# Patient Record
Sex: MALE | Race: BLACK OR AFRICAN AMERICAN | NOT HISPANIC OR LATINO | URBAN - METROPOLITAN AREA
[De-identification: names, ages, dates, MRNs, and addresses within clinical notes are randomized per-mention and may not be internally consistent; named-entity substitution may affect disease eponyms.]

---

## 2019-06-24 ENCOUNTER — OFFICE VISIT CONVERTED (OUTPATIENT)
Dept: PULMONOLOGY | Facility: CLINIC | Age: 59
End: 2019-06-24
Attending: INTERNAL MEDICINE

## 2021-05-28 VITALS
RESPIRATION RATE: 12 BRPM | BODY MASS INDEX: 43.05 KG/M2 | SYSTOLIC BLOOD PRESSURE: 147 MMHG | OXYGEN SATURATION: 98 % | HEART RATE: 68 BPM | HEIGHT: 71 IN | DIASTOLIC BLOOD PRESSURE: 93 MMHG | TEMPERATURE: 98.3 F | WEIGHT: 307.5 LBS

## 2021-05-28 NOTE — PROGRESS NOTES
Patient: MARY FAIRBANKS     Acct: [id number]     Report: #[id number]  UNIT #: [id number]     : 1960    Encounter Date:2019  PRIMARY CARE: PINEDA ECHEVERRIA  ***Signed***  --------------------------------------------------------------------------------------------------------------------  Chief Complaint      Encounter Date      2019            Primary Care Provider      PINEDA ECHEVERRIA            Referring Provider      PINEDA ECHEVERRIA            Patient Complaint      Patient is complaining of      New pt here for COPD            VITALS      Height 5 ft 11 in / 180.34 cm      Weight 307 lbs 8 oz / 139.109384 kg      BSA 2.53 m2      BMI 42.9 kg/m2      Temperature 98.3 F / 36.83 C - Oral      Pulse 68      Respirations 12      Blood Pressure 147/93 Sitting, Right Arm      Pulse Oximetry 98%, room air      Initial Exhaled Nitrous Oxide      Date:  2019      Exhaled Nitrous Oxide Results:  5            HPI      The patient is a 59 year old male who sees Dr. Skinner for rheumatology. He has    a diagnosis of rheumatoid arthritis and is on Enbrel.  He is referred to us for     CT scan abnormality.  He had CT scan of the chest done at [facility] back in 2018. At that time, he had CT scan of abdomen and pelvis     which showed lung nodule 5 mm size. Because of that he had repeat CT scan in     month at [facility] which showed a stable lung nodule. CT scan was    done on 2018 and showed 5 mm subpleural right middle lobe nodule. This     also showed pulmonary emphysema and single enlarged AP window lymph node     doubtful clinical significance.  He does smoke everyday.  He use to smoke one     pack per day for many years. He started smoking when he was 15 and is currently     down to around 15 cigarettes a day.  He has shortness of breath with activity     and is not able to move around a long distance.  He has a cough with clear     phlegm, has no chest pain  or chest tightness, no changes in weight or appetite.     He in fact has gained weight. He was [place], but moved to [place] 1.5 years     ago.  He currently takes Anoro daily, but does not have a rescue inhaler. He     does use a CPAP machine everyday. It is around two years old.  He receives his     supplies from [employer].            ROS      Constitutional:  Denies: Fatigue, Fever, Weight gain, Weight loss, Chills,     Insomnia, Other      Respiratory/Breathing:  Complains of: Shortness of air, Wheezing, Cough; Denies:    Hemoptysis, Pleuritic pain, Other      Endocrine:  Denies: Polydipsia, Polyuria, Heat/cold intolerance, Diabetes, Other      Eyes:  Denies: Blurred vision, Vision Changes, Other      Ears, nose, mouth, throat:  Denies: Congestion, Dysphagia, Hearing Changes, Nose    Bleeding, Nasal Discharge, Throat pain, Tinnitus, Other      Cardiovascular:  Denies: Chest Pain, Exertional dyspnea, Peripheral Edema,     Palpitations, Syncope, Wake up Gasping for air, Orthopnea, Tachycardia, Other      Gastrointestinal:  Denies: Abdominal pain/cramping, Bloody stools, Constipation,    Diarrhea, Melena, Nausea, Vomiting, Other      Genitourinary:  Denies: Dysuria, Urinary frequency, Incontinence, Hematuria,     Urgency, Other      Musculoskeletal:  Denies: Joint Pain, Joint Stiffness, Joint Swelling, Myalgias,    Other      Hematologic/lymphatic:  DENIES: Lymphadenopathy, Bruising, Bleeding tendencies,     Other      Neurologic:  Denies: Headache, Numbness, Weakness, Seizures, Other      Psychiatric:  Denies: Anxiety, Appropriate Effect, Depression, Other      Sleep:  No: Excessive daytime sleep, Morning Headache?, Snoring, Insomnia?, Stop    breathing at sleep?, Other      Integumentary:  Denies: Rash, Dry skin, Skin Warm to Touch, Other            FAMILY/SOCIAL/MEDICAL HX      Surgical History:  Yes: Abdominal Surgery (hernia repair x 2), CABG (stents x3,     ), Orthopedic Surgery (necrotic rt hip, carpal  tunnel right wrist), Vascular     Surgery (carotid atery left side)      Stroke - Family Hx:  Sister      Diabetes - Family Hx:  Father      Cancer/Type - Family Hx:  Sister (Lung cancer)      Is Father Still Living?:  No      Is Mother Still Living?:  No      Social History:  Tobacco Use; No Alcohol Use, No Recreational Drug use      Smoking status:  Current every day smoker (1/2 x 45 years)      Anticoagulation Therapy:  No      Antibiotic Prophylaxis:  No      Medical History:  Yes: Asthma, Chronic Bronchitis/COPD, High Blood Pressure,     High Cholesterol, Reflux Disease, Shortness Of Breath, Sinus Trouble      Psychiatric History      None            PREVENTION      Hx Influenza Vaccination:  No      Influenza Vaccine Declined:  Yes      2 or More Falls Past Year?:  No      Fall Past Year with Injury?:  No      Hx Pneumococcal Vaccination:  No      Encouraged to follow-up with:  PCP regarding preventative exams.      Chart initiated by      Gema Mijares MA            ALLERGIES/MEDICATIONS      Allergies:        Coded Allergies:             Crayfish (Verified  Allergy, Intermediate, 6/24/19)                  blisters           No Known Drug Allergies (Verified  Allergy, Intermediate, 6/24/19)      Medications    Last Reconciled on 6/24/19 13:44 by DYLAN DANIEL MD MDI-Albuterol (Ventolin HFA) 8 Gm Hfa.aer.ad      2 PUFFS INH Q4-6H PRN for DYSPNEA, #1 INH 6 Refills         Prov: Dylan Daniel         6/24/19       Umeclidinium/Vilanterol 62.5-25 Mcg Inh (Anoro Ellipta 62.5-25 Mcg Inh) 1 Each     Blst.w.dev      1 PUFF INH QDAY, #1 INH 9 Refills         Prov: Dylan Daniel         6/24/19       Nicotine Polacrilex (Nicorette) 2 Mg Gum      2 MG PO Q4-6H PRN for SMOKING CESSATION, #180 BOX 2 Refills         Prov: Dylan Daniel         6/24/19       Nicotine 21 Mg Patch (Nicoderm 21 Mg Patch) 1 Each Patch.td24      21 MG TRANSDERM QDAY for 30 Days, #30 PATCH 9 Refills         Prov: Dylan Daniel         6/24/19        (cpap)   No Conflict Check               Reported         6/24/19       Sacubitril/Valsartan (Entresto 49/51 mg) 1 Each Tablet      1 TAB PO BID, #60 TAB 0 Refills         Reported         6/24/19       Carvedilol (Coreg) 25 Mg Tablet      25 MG PO BID, #60 TAB 0 Refills         Reported         6/24/19       Diclofenac Sodium (Diclofenac Sodium) 75 Mg Tablet.dr      75 MG PO BID, #60 TAB 0 Refills         Reported         6/24/19       Cyclobenzaprine Hcl (Cyclobenzaprine*) 10 Mg Tablet      10 MG PO prn PRN for MUSCLE SPASMS, TAB 0 Refills         Reported         6/24/19       Etanercept (Enbrel*) 50 Mg/1 Ml Syringe      50 MG SUBQ Fr, ML         Reported         6/24/19       Ibuprofen (Ibuprofen) 200 Mg Tab      200 MG PO BID, #100 TAB 0 Refills         Reported         6/24/19       Omeprazole (Omeprazole*) 20 Mg Tablet.dr      20 MG PO QDAY, #30 CAP 0 Refills         Reported         6/24/19       Umeclidinium/Vilanterol 62.5-25 Mcg Inh (Anoro Ellipta 62.5-25 Mcg Inh) 1 Each     Blst.w.dev      1 PUFF INH QDAY, #1 INH 0 Refills         Reported         6/24/19       Aspirin Chew (Aspirin Baby) 81 Mg Tab.chew      81 MG PO QDAY, #30 TAB.CHEW 0 Refills         Reported         6/24/19      Current Medications      Current Medications Reviewed 6/24/19            EXAM      CONSTITUTIONAL: Pleasant  morbidly obese male in no acute distress, normal conv    ersant.       EYES : Pink conjunctive, no ptosis, PERRL.       ENMT : Nose and ears appear normal, normal dentition, mild posterior pharyngeal     wall erythema, no sinus tenderness. Mallampati classification 3, mild posterior     pharyngeal wall erythema, no sinus tenderness.        Neck: Nontender, no masses, no thyromegaly, no nodules.      Resp : Bilateral diminished breath sounds, no wheezing, crackles or rhonchi.      Resonant to percussion bilaterally.      CVS  : No carotid bruits, s1s2 nl, RRR, no murmur, rubs or gallop, peripheral ed    ema bilateral  lower extremities, nontender to palpation.        Chest wall: Normal rise with inspiration, nontender on palpation.      GI   : Abdomen soft, with no masses, no hepatosplenomegaly, no hernias, BS+      MSK  : Normal gait and station, no digital cyanosis or clubbing.         Skin : No rashes, ulcerations or lesions, normal turgor and temperature      Neuro: CN II - XII intact, no sensory deficits, DTRs intact and symmetrical, no     motor weakness      Psych: Appropriate affect, A   Vitals      Vitals:             Height 5 ft 11 in / 180.34 cm           Weight 307 lbs 8 oz / 139.442068 kg           BSA 2.53 m2           BMI 42.9 kg/m2           Temperature 98.3 F / 36.83 C - Oral           Pulse 68           Respirations 12           Blood Pressure 147/93 Sitting, Right Arm           Pulse Oximetry 98%, room air            REVIEW      Results Reviewed      PCCS Results Reviewed?:  Yes Prev Lab Results, Yes Prev Radiology Results, Yes     Previous Mecial Records      Lab Results      The patient's office visit notes from his PCP, KRISTOPHER Block was reviewed.      Radiographic Results      The patient's CT scan of the chest from [facility] from 12/11/2018     was reviewed. This showed a 5 mm subpleural right middle lobe nodule, pulmonary     emphysema, single enlarged AP window lymph node of doubtful clinical     significance and coronary artery disease. CT scan was compared to previous CT of     the abdomen and pelvis from 11/19/2018.            Assessment      COPD (chronic obstructive pulmonary disease)         Centrilobular emphysema - J43.2         Emphysema type: centrilobular            Lung nodule - R91.1            Notes      New Medications      * Aspirin Chew (Aspirin Baby) 81 MG TAB.CHEW: 81 MG PO QDAY #30      * Umeclidinium/Vilanterol 62.5-25 Mcg Inh (Anoro Ellipta 62.5-25 Mcg Inh) 1 EACH       BLST.W.DEV: 1 PUFF INH QDAY #1      * Omeprazole (Omeprazole*) 20 MG TABLET.DR: 20 MG PO QDAY  #30      * Ibuprofen 200 MG TAB: 200 MG PO BID #100      * Etanercept (Enbrel*) 50 MG/1 ML SYRINGE: 50 MG SUBQ Fr      * CYCLOBENZAPRINE HCL (Cyclobenzaprine*) 10 MG TABLET: 10 MG PO prn PRN MUSCLE       SPASMS      * Diclofenac Sodium 75 MG TABLET.DR: 75 MG PO BID #60      * Carvedilol (Coreg) 25 MG TABLET: 25 MG PO BID #60      * Sacubitril/Valsartan (Entresto 49/51 mg) 1 EACH TABLET: 1 TAB PO BID #60         Replaced Sacubitril/Valsartan (Entresto 24/26 mg) 1 EACH TABLET:         1 TAB PO BID #60      * (cpap):       * Nicotine 21 Mg Patch (Nicoderm 21 Mg Patch) 1 EACH PATCH.TD24: 21 MG TRANSDERM       QDAY 30 Days #30      * Nicotine Polacrilex (Nicorette) 2 MG GUM: 2 MG PO Q4-6H PRN SMOKING CESSATION       #180      * Umeclidinium/Vilanterol 62.5-25 Mcg Inh (Anoro Ellipta 62.5-25 Mcg Inh) 1 EACH       BLST.W.DEV: 1 PUFF INH QDAY #1      * MDI-Albuterol (Ventolin HFA) 8 GM HFA.AER.AD: 2 PUFFS INH Q4-6H PRN DYSPNEA #1      New Diagnostics      * 6 Min Walk With Pulse Ox, Routine         Dx: COPD (chronic obstructive pulmonary disease) - J44.9      * PFT-Comp, PrePost,DLCO,BodyBox, Week         Dx: COPD (chronic obstructive pulmonary disease) - J44.9      * Chest W/O Cont CT, SCHEDULED PROCEDURE         Dx: Lung nodule - R91.1      PLAN:  The patient is a 59 year old male with morbid obesity, obstructive sleep     apnea, rheumatoid arthritis, lung nodules and COPD who continues to smoke     heavily.              1. COPD. Check pulmonary function test and six minute walk test. Continue with     Anoro, use albuterol as needed.  He needs to quit smoking. Smoking cessation:     Counseling was done for more than five minutes. He is willing to try nicotine     patch and gums to quit. He had tried Chantix in the past, but had significant     mood swings.  I will check alpha 1 antitrypsin level and genetics.  We will     check a FENO today.        2.  Obstructive sleep apnea.  Sleep hygiene was discussed in detail. Weight  loss     counseling was done.  Continue with CPAP machine at current settings.  I     advised him to get the CPAP data for next office visit to review when she is     here.      3.  Lung nodule. He had 5 mm lung nodules subpleural, left side.  I will repeat     CT scan of the chest now.  If this is stable, he will likely need low dose lung     cancer screening CT scan every year.        4. Follow up in 2-3 months, earlier if needed.            Patient Education      Education resources provided:  Yes      Patient Education Provided:  Acute Bronchitis            Patient Education:        Chronic Obstructive Pulmonary Disease                 Disclaimer: Converted document may not contain table formatting or lab diagrams. Please see [employer] for the authenticated document.